# Patient Record
Sex: FEMALE | Race: WHITE | Employment: UNEMPLOYED
[De-identification: names, ages, dates, MRNs, and addresses within clinical notes are randomized per-mention and may not be internally consistent; named-entity substitution may affect disease eponyms.]

---

## 2023-09-06 ENCOUNTER — HOSPITAL ENCOUNTER (OUTPATIENT)
Facility: HOSPITAL | Age: 78
Setting detail: RECURRING SERIES
Discharge: HOME | End: 2023-09-09
Payer: MEDICARE

## 2023-09-06 PROCEDURE — 97162 PT EVAL MOD COMPLEX 30 MIN: CPT

## 2023-09-06 NOTE — THERAPY EVALUATION
interest  Persons(s) to be included in education: patient (P)  Barriers to Learning/Limitations: none  Measures taken if barriers to learning present: NA  Patient Goal (s): \"strengthening of joints, muscles\". Patient Self Reported Health Status: good  Rehabilitation Potential: good    Short Term Goals: To be accomplished in 3-4 weeks  Patient independent and compliant with progressive HEP/Self-Care Routine. Status at IE:  No HEP. Decrease max pain scale rating to </= 2/10. Status at IE:  range from 2 to 7/10. Increase PROM bilat hip ER to >/= 65 deg. Status at IE:  about 50 deg bilat. Increase trunk AROM for bilat rotations to decreased by </= 25% and without pain. Status at IE:  decreased by about 50% bilat and LBP. Long Term Goals: To be accomplished in 4-6 weeks  Improve FOTO scores by >/= 11 to 12 points. Status at IE:  knee 47; hip 47. Increase LE MMT scores by >/= 1/3 grade and/or to >/= 5-/5 throughout. Status at IE:  Knee Ext 4 to 4+/5 left with some anterior discomfort; Knee Flex 5-/5 left and OK; Ankle DF WFL left and OK; Ankle PF WFL left and OK; Ankle EV WFL left and OK; ankle INV WFL left and OK; Hip IR 4-/5 left with knee discomfort and 5/5 right and OK; Hip ER 5/5 and OK bilat; Hip Flex 5/5 and OK bilat; Hip Abduction (supine) 5/5 and OK bilat; Hip Adduction (supine) 5/5 and OK bilat; Hip Extension Not Tested/TBA; HR in SLS 4+/5 bilat, right > left. Sit <==> stand without hand use 5x in </= 15 seconds               Status at IE:  slowed and hand use. No tenderness to palpation. Status at IE:  some tenderness and increased muscle tension left L-S paraspinals and quadratus lumborum.      Frequency / Duration: Patient to be seen 2 times per week for 4-6 weeks    Patient/ Caregiver education and instruction: Diagnosis, prognosis, self care, activity modification, and exercises [x]  Plan of care has

## 2023-09-06 NOTE — PROGRESS NOTES
PHYSICAL / OCCUPATIONAL THERAPY - DAILY TREATMENT NOTE (updated )    Patient Name: Stacy Pagan    Date: 2023    : 1945  Insurance: Payor: [address] [place] [address] / Plan: Christi Lim / Product Type: *No Product type* /      Patient  verified Yes     Visit #   Current / Total 1 12   Time   In / Out 1:05 1:45   Pain   In / Out 2/10 2/10   Subjective Functional Status/Changes: See Eval/POC. TREATMENT AREA =  Pain in left hip [M25.552]  Pain in left knee [M25.562]  Pain in right hip [M25.551]  Other low back pain [M54.59]    OBJECTIVE    45 min [x]Eval - untimed                      Therapeutic Procedures: Tx Min Billable or 1:1 Min (if diff from [employer]) Procedure, Rationale, Specifics          Details if applicable:              Details if applicable:            Details if applicable:            Details if applicable:            Details if applicable:     0 0 MC BC Totals Reminder: bill using total billable min of TIMED therapeutic procedures (example: do not include dry needle or estim unattended, both untimed codes, in totals to left)  8-22 min = 1 unit; 23-37 min = 2 units; 38-52 min = 3 units; 53-67 min = 4 units; 68-82 min = 5 units   Total Total     [x]  Patient Education billed concurrently with other procedures   [x] Review HEP    [] Progressed/Changed HEP, detail:    [] Other detail:       Objective Information/Functional Measures/Assessment    See Eval/POC.     Patient will continue to benefit from skilled PT / OT services to modify and progress therapeutic interventions, analyze and address functional mobility deficits, analyze and address ROM deficits, analyze and address strength deficits, analyze and address soft tissue restrictions, analyze and cue for proper movement patterns, analyze and modify for postural abnormalities, analyze and address imbalance/dizziness, and instruct in home and community integration to address functional deficits and attain remaining

## 2023-09-21 ENCOUNTER — HOSPITAL ENCOUNTER (OUTPATIENT)
Facility: HOSPITAL | Age: 78
Setting detail: RECURRING SERIES
Discharge: HOME | End: 2023-09-24
Payer: MEDICARE

## 2023-09-21 PROCEDURE — 97110 THERAPEUTIC EXERCISES: CPT

## 2023-09-21 PROCEDURE — 97530 THERAPEUTIC ACTIVITIES: CPT

## 2023-09-21 NOTE — PROGRESS NOTES
PHYSICAL / OCCUPATIONAL THERAPY - DAILY TREATMENT NOTE (updated )    Patient Name: Cristina Elliott    Date: 2023    : 1945  Insurance: Payor: Mckinley Greene / Plan: Anisa Sharpe / Product Type: *No Product type* /      Patient  verified Yes     Visit #   Current / Total 2 12   Time   In / Out 11:43 12:26   Pain   In / Out 4/10 4/10   Subjective Functional Status/Changes: Pt reports pain down bilat LE's, left knee (medial) with crepitus, bilat feet; cramps this morning bilat feet. TREATMENT AREA =  Pain in left hip [M25.552]  Pain in left knee [M25.562]  Pain in right hip [M25.551]  Other low back pain [M54.59]    OBJECTIVE    Therapeutic Procedures: Tx Min Billable or 1:1 Min (if diff from Tx Min) Procedure, Rationale, Specifics   33 33 07224 Therapeutic Exercise (timed):  increase ROM, strength, coordination, balance, and proprioception to improve patient's ability to progress to PLOF and address remaining functional goals. (see flow sheet as applicable)     Details if applicable:  HEP activities instructed and performed with handouts issued and copies placed in chart. 10 95 56621 Therapeutic Activity (timed):  use of dynamic activities replicating functional movements to increase ROM, strength, coordination, balance, and proprioception in order to improve patient's ability to progress to PLOF and address remaining functional goals. (see flow sheet as applicable)     Details if applicable:     0 0 14537 Neuromuscular Re-Education (timed):  improve balance, coordination, kinesthetic sense, posture, core stability and proprioception to improve patient's ability to develop conscious control of individual muscles and awareness of position of extremities in order to progress to PLOF and address remaining functional goals.  (see flow sheet as applicable)     Details if applicable:     0 0 94482 Self Care/Home Management (timed):  improve patient knowledge and

## 2023-09-25 ENCOUNTER — HOSPITAL ENCOUNTER (OUTPATIENT)
Facility: HOSPITAL | Age: 78
Setting detail: RECURRING SERIES
Discharge: HOME | End: 2023-09-28
Payer: MEDICARE

## 2023-09-25 PROCEDURE — 97530 THERAPEUTIC ACTIVITIES: CPT

## 2023-09-25 PROCEDURE — 97110 THERAPEUTIC EXERCISES: CPT

## 2023-09-25 NOTE — PROGRESS NOTES
posture/ergonomics, home safety, activity modification, diagnosis/prognosis, and physical therapy expectations, procedures and progression  to improve patient's ability to progress to PLOF and address remaining functional goals. (see flow sheet as applicable)     Details if applicable:            Details if applicable:     36 40 MC BC Totals Reminder: bill using total billable min of TIMED therapeutic procedures (example: do not include dry needle or estim unattended, both untimed codes, in totals to left)  8-22 min = 1 unit; 23-37 min = 2 units; 38-52 min = 3 units; 53-67 min = 4 units; 68-82 min = 5 units   Total Total     [x]  Patient Education billed concurrently with other procedures   [x] Review HEP    [] Progressed/Changed HEP, detail:    [] Other detail:       Objective Information/Functional Measures/Assessment    Continue with therx per flow sheet. No change in function noted today. Patient will continue to benefit from skilled PT / OT services to modify and progress therapeutic interventions, analyze and address functional mobility deficits, analyze and address ROM deficits, analyze and address strength deficits, analyze and address soft tissue restrictions, analyze and cue for proper movement patterns, analyze and modify for postural abnormalities, analyze and address imbalance/dizziness, and instruct in home and community integration to address functional deficits and attain remaining goals. Progress toward goals / Updated goals:  []  See Progress Note/Recertification    Short Term Goals: To be accomplished in 3-4 weeks  Patient independent and compliant with progressive HEP/Self-Care Routine. Status at IE:  No HEP. HEP handout issued for 2 LE stretches. Decrease max pain scale rating to </= 2/10. Status at IE:  range from 2 to 7/10.   4/10 pre- and post-Tx. Increase PROM bilat hip ER to >/= 65 deg. Status at IE:  about 50 deg bilat.   Did well with

## 2023-09-26 ENCOUNTER — APPOINTMENT (OUTPATIENT)
Facility: HOSPITAL | Age: 78
End: 2023-09-26
Payer: MEDICARE

## 2023-09-27 ENCOUNTER — HOSPITAL ENCOUNTER (OUTPATIENT)
Facility: HOSPITAL | Age: 78
Setting detail: RECURRING SERIES
Discharge: HOME | End: 2023-09-30
Payer: MEDICARE

## 2023-09-27 PROCEDURE — 97530 THERAPEUTIC ACTIVITIES: CPT

## 2023-09-27 PROCEDURE — 97110 THERAPEUTIC EXERCISES: CPT

## 2023-10-03 ENCOUNTER — HOSPITAL ENCOUNTER (OUTPATIENT)
Facility: HOSPITAL | Age: 78
Setting detail: RECURRING SERIES
Discharge: HOME | End: 2023-10-06
Payer: MEDICARE

## 2023-10-03 PROCEDURE — 97530 THERAPEUTIC ACTIVITIES: CPT

## 2023-10-03 PROCEDURE — 97535 SELF CARE MNGMENT TRAINING: CPT

## 2023-10-03 PROCEDURE — 97110 THERAPEUTIC EXERCISES: CPT

## 2023-10-03 NOTE — PROGRESS NOTES
PHYSICAL / OCCUPATIONAL THERAPY - DAILY TREATMENT NOTE (updated )    Patient Name: Abdiel Andres    Date: 10/3/2023    : 1945  Insurance: Payor: Andrez Jimenes / Plan: Matt Levy / Product Type: *No Product type* /      Patient  verified Yes     Visit #   Current / Total 5 12   Time   In / Out 1145 1225   Pain   In / Out 3 (R) thigh area. 0   Subjective Functional Status/Changes: I don't really have to much pain. TREATMENT AREA =  Pain in left hip [M25.552]  Pain in left knee [M25.562]  Pain in right hip [M25.551]  Other low back pain [M54.59]    OBJECTIVE    Therapeutic Procedures: Tx Min Billable or 1:1 Min (if diff from Tx Min) Procedure, Rationale, Specifics   10 10 07737 Therapeutic Exercise (timed):  increase ROM, strength, coordination, balance, and proprioception to improve patient's ability to progress to PLOF and address remaining functional goals. (see flow sheet as applicable)     Details if applicable:  HEP activities instructed and performed with handouts issued and copies placed in chart. 15 15 64199 Therapeutic Activity (timed):  use of dynamic activities replicating functional movements to increase ROM, strength, coordination, balance, and proprioception in order to improve patient's ability to progress to PLOF and address remaining functional goals. (see flow sheet as applicable)     Details if applicable:       52541 Neuromuscular Re-Education (timed):  improve balance, coordination, kinesthetic sense, posture, core stability and proprioception to improve patient's ability to develop conscious control of individual muscles and awareness of position of extremities in order to progress to PLOF and address remaining functional goals.  (see flow sheet as applicable)     Details if applicable:     15 15 46503 Self Care/Home Management (timed):  improve patient knowledge and understanding of pain reducing techniques, positioning, posture/ergonomics, home

## 2023-10-05 ENCOUNTER — HOSPITAL ENCOUNTER (OUTPATIENT)
Facility: HOSPITAL | Age: 78
Setting detail: RECURRING SERIES
Discharge: HOME | End: 2023-10-08
Payer: MEDICARE

## 2023-10-05 PROCEDURE — 97110 THERAPEUTIC EXERCISES: CPT

## 2023-10-05 PROCEDURE — 97535 SELF CARE MNGMENT TRAINING: CPT

## 2023-10-05 PROCEDURE — 97530 THERAPEUTIC ACTIVITIES: CPT

## 2023-10-05 NOTE — PROGRESS NOTES
PHYSICAL / OCCUPATIONAL THERAPY - DAILY TREATMENT NOTE (updated )    Patient Name: Gary Limb    Date: 10/5/2023    : 1945  Insurance: Payor: Maria M Hearing / Plan: Ricci Wu / Product Type: *No Product type* /      Patient  verified Yes     Visit #   Current / Total 6 12   Time   In / Out 11:57 am 1246 pm   Pain   In / Out 2-3 0   Subjective Functional Status/Changes: Low back 2-3/10 ; soreness R hip intermittent numbness in the last 2 days . Having increased stress secondary to loosing phone. \"Knocking\" in left knee   Performing HEP daily  Pain range: 1 to 3/10      TREATMENT AREA =  Pain in left hip [M25.552]  Pain in left knee [M25.562]  Pain in right hip [M25.551]  Other low back pain [M54.59]    OBJECTIVE    Modalities Rationale:     decrease edema, decrease inflammation, decrease pain, and increase tissue extensibility to improve patient's ability to progress to PLOF and address remaining functional goals. min [] Estim Unattended, type/location:                                      []  w/ice    []  w/heat    min [] Estim Attended, type/location:                                     []  w/US     []  w/ice    []  w/heat    []  TENS insruct      min []  Mechanical Traction: type/lbs                   []  pro   []  sup   []  int   []  cont    []  before manual    []  after manual    min []  Ultrasound, settings/location:      min  unbill []  Ice     []  Heat    location/position:     min []  Paraffin,  details:     min []  Vasopneumatic Device, press/temp:     min []  Joleen Mariano / [place]: If using vaso (only need to measure limb vaso being performed on)      pre-treatment girth :       post-treatment girth :       measured at (landmark location) :      min []  Other:    Skin assessment post-treatment:   Intact      Therapeutic Procedures:     Tx Min Billable or 1:1 Min (if diff from Tx Min) Procedure, Rationale, Specifics   23  80554 Therapeutic Exercise (timed):

## 2023-10-05 NOTE — PROGRESS NOTES
[facility]  [address] [facility], [address], [place], [address] Ph: [phone] Fx: [phone]  PHYSICAL THERAPY PROGRESS NOTE  Patient Name: Flavia Marie : 1945   Treatment/Medical Diagnosis: Pain in left hip [M25.552]  Pain in left knee [M25.562]  Pain in right hip [M25.551]  Other low back pain [M54.59]   Referral Source: Mindy Womack MD     Date of Initial Visit: 2023 Attended Visits: 6 Missed Visits: 0     SUMMARY OF TREATMENT  Physical Therapy treatment has consisted of Therapeutic exercise , Therapeutic Activity for positioning, body mechanics, activity modification for pain management, Neuromuscular re education, and HEP    CURRENT STATUS  Ms Frances Gonzalez has made good progress in physical therapy. Pt reports her pain range: 0-3/ 10   GROC: +1 a tiny bit better  Functional Deficits: quit a bit of difficulty walking a mile, standing for an hour; Moderate difficulty performing heavy activities around the home, performing light activities around the home    Patient independent and compliant with progressive HEP/Self-Care Routine. Status at IE:  No HEP. Current Status: Pt performing HEP daily  Goal Met?  yes     Decrease max pain scale rating to </= 2/10. Status at IE:  range from 2 to 7/10. Current Status: 0-3/10   Goal Met? Partially met     Increase PROM bilat hip ER to >/= 65 deg. Status at IE:  about 50 deg bilat. Current status: ~ 65 degrees bilaterally  Goal Met?  yes     Increase trunk AROM for bilat rotations to decreased by </= 25% and without pain. Status at IE:  decreased by about 50% bilat and LBP. Current Status: AROM bilateral rotations WNLs and without pain  Goal Met?  yes    Payor: [employer] MEDICARE / Plan: [employer]ICARE / Product Type: *No Product type* /     Long Term Goals:  To be accomplished in 4-6 weeks  Improve FOTO scores by >/= 11

## 2023-10-10 ENCOUNTER — HOSPITAL ENCOUNTER (OUTPATIENT)
Facility: HOSPITAL | Age: 78
Setting detail: RECURRING SERIES
Discharge: HOME | End: 2023-10-13
Payer: MEDICARE

## 2023-10-10 PROCEDURE — 97112 NEUROMUSCULAR REEDUCATION: CPT

## 2023-10-10 PROCEDURE — 97530 THERAPEUTIC ACTIVITIES: CPT

## 2023-10-10 PROCEDURE — 97110 THERAPEUTIC EXERCISES: CPT

## 2023-10-10 NOTE — PROGRESS NOTES
PHYSICAL / OCCUPATIONAL THERAPY - DAILY TREATMENT NOTE (updated )    Patient Name: Abdiel Andres    Date: 10/10/2023    : 1945  Insurance: Payor: Andrez Jimenes / Plan: Matt Levy / Product Type: *No Product type* /      Patient  verified Yes     Visit #   Current / Total 7 20   Time   In / Out 220 300   Pain   In / Out 2 0   Subjective Functional Status/Changes: No new c/o     TREATMENT AREA =  Pain in left hip [M25.552]  Pain in left knee [M25.562]  Pain in right hip [M25.551]  Other low back pain [M54.59]    OBJECTIVE    Therapeutic Procedures: Tx Min Billable or 1:1 Min (if diff from Tx Min) Procedure, Rationale, Specifics   15 15 15240 Therapeutic Exercise (timed):  increase ROM, strength, coordination, balance, and proprioception to improve patient's ability to progress to PLOF and address remaining functional goals. (see flow sheet as applicable)     Details if applicable:  HEP activities instructed and performed with handouts issued and copies placed in chart. 15 02 75161 Therapeutic Activity (timed):  use of dynamic activities replicating functional movements to increase ROM, strength, coordination, balance, and proprioception in order to improve patient's ability to progress to PLOF and address remaining functional goals. (see flow sheet as applicable)     Details if applicable:     10 10 21336 Neuromuscular Re-Education (timed):  improve balance, coordination, kinesthetic sense, posture, core stability and proprioception to improve patient's ability to develop conscious control of individual muscles and awareness of position of extremities in order to progress to PLOF and address remaining functional goals.  (see flow sheet as applicable)     Details if applicable:       22762 Self Care/Home Management (timed):  improve patient knowledge and understanding of pain reducing techniques, positioning, posture/ergonomics, home safety, activity modification,

## 2023-10-12 ENCOUNTER — HOSPITAL ENCOUNTER (OUTPATIENT)
Facility: HOSPITAL | Age: 78
Setting detail: RECURRING SERIES
Discharge: HOME | End: 2023-10-15
Payer: MEDICARE

## 2023-10-12 PROCEDURE — 97110 THERAPEUTIC EXERCISES: CPT

## 2023-10-12 PROCEDURE — 97112 NEUROMUSCULAR REEDUCATION: CPT

## 2023-10-12 NOTE — PROGRESS NOTES
PHYSICAL / OCCUPATIONAL THERAPY - DAILY TREATMENT NOTE (updated )    Patient Name: Rex Ends    Date: 10/12/2023    : 1945  Insurance: Payor: Katie Danielle / Plan: Dirk Baig / Product Type: *No Product type* /      Patient  verified Yes     Visit #   Current / Total 8 20   Time   In / Out 11:45 13:35   Pain   In / Out 2/10 0/10   Subjective Functional Status/Changes: Pt reports left > right anterior LE discomfort--\"radiating\". TREATMENT AREA =  Pain in left hip [M25.552]  Pain in left knee [M25.562]  Pain in right hip [M25.551]  Other low back pain [M54.59]    OBJECTIVE    Therapeutic Procedures: Tx Min Billable or 1:1 Min (if diff from Tx Min) Procedure, Rationale, Specifics   40 35 93586 Therapeutic Exercise (timed):  increase ROM, strength, coordination, balance, and proprioception to improve patient's ability to progress to PLOF and address remaining functional goals. (see flow sheet as applicable)     Details if applicable:       0 0 24116 Therapeutic Activity (timed):  use of dynamic activities replicating functional movements to increase ROM, strength, coordination, balance, and proprioception in order to improve patient's ability to progress to PLOF and address remaining functional goals. (see flow sheet as applicable)     Details if applicable:     10 10 32586 Neuromuscular Re-Education (timed):  improve balance, coordination, kinesthetic sense, posture, core stability and proprioception to improve patient's ability to develop conscious control of individual muscles and awareness of position of extremities in order to progress to PLOF and address remaining functional goals.  (see flow sheet as applicable)     Details if applicable:            Details if applicable:            Details if applicable:     50 39 [address] Reminder: bill using total billable min of TIMED therapeutic procedures (example: do not include dry needle or estim unattended, both

## 2023-11-01 ENCOUNTER — HOSPITAL ENCOUNTER (OUTPATIENT)
Facility: HOSPITAL | Age: 78
Setting detail: RECURRING SERIES
Discharge: HOME | End: 2023-11-04
Payer: MEDICARE

## 2023-11-01 PROCEDURE — 97112 NEUROMUSCULAR REEDUCATION: CPT

## 2023-11-01 PROCEDURE — 97110 THERAPEUTIC EXERCISES: CPT

## 2023-11-01 PROCEDURE — 97530 THERAPEUTIC ACTIVITIES: CPT

## 2023-11-01 NOTE — PROGRESS NOTES
PHYSICAL / OCCUPATIONAL THERAPY - DAILY TREATMENT NOTE (updated )    Patient Name: Mayela Lentz    Date: 2023    : 1945  Insurance: Payor: Nikko Lopez / Plan: Jillian Lizbeth / Product Type: *No Product type* /      Patient  verified Yes     Visit #   Current / Total 9 20   Time   In / Out 5:03 5:46   Pain   In / Out 2/10 0/10   Subjective Functional Status/Changes: Feeling low on energy, has pain in legs at night due to neuropathy, would like to build endurance     TREATMENT AREA =  Pain in left hip [M25.552]  Pain in left knee [M25.562]  Pain in right hip [M25.551]  Other low back pain [M54.59]    OBJECTIVE       Therapeutic Procedures: Tx Min Billable or 1:1 Min (if diff from Tx Min) Procedure, Rationale, Specifics   15  47326 Therapeutic Exercise (timed):  increase ROM, strength, coordination, balance, and proprioception to improve patient's ability to progress to PLOF and address remaining functional goals. (see flow sheet as applicable)     Details if applicable:       15  23397 Neuromuscular Re-Education (timed):  improve balance, coordination, kinesthetic sense, posture, core stability and proprioception to improve patient's ability to develop conscious control of individual muscles and awareness of position of extremities in order to progress to PLOF and address remaining functional goals. (see flow sheet as applicable)     Details if applicable:     13  81566 Therapeutic Activity (timed):  use of dynamic activities replicating functional movements to increase ROM, strength, coordination, balance, and proprioception in order to improve patient's ability to progress to PLOF and address remaining functional goals.   (see flow sheet as applicable)     Details if applicable:            Details if applicable:            Details if applicable:       [facility] BC Totals Reminder: bill using total billable min of TIMED therapeutic procedures (example: do not include dry

## 2023-11-06 ENCOUNTER — HOSPITAL ENCOUNTER (OUTPATIENT)
Facility: HOSPITAL | Age: 78
Setting detail: RECURRING SERIES
Discharge: HOME | End: 2023-11-09
Payer: MEDICARE

## 2023-11-06 PROCEDURE — 97535 SELF CARE MNGMENT TRAINING: CPT

## 2023-11-06 PROCEDURE — 97112 NEUROMUSCULAR REEDUCATION: CPT

## 2023-11-06 PROCEDURE — 97110 THERAPEUTIC EXERCISES: CPT

## 2023-11-06 NOTE — PROGRESS NOTES
[facility]  [address] [facility], [address], [place], [address] Ph: [phone] Fx: [phone]  PHYSICAL THERAPY PROGRESS NOTE  Patient Name: Abdiel Andres : 1945   Treatment/Medical Diagnosis: Pain in left hip [M25.552]  Pain in left knee [M25.562]  Pain in right hip [M25.551]  Other low back pain [M54.59]   Referral Source: Ollie Wynne MD     Date of Initial Visit: 2023 Attended Visits: 10 Missed Visits: 0     SUMMARY OF TREATMENT  Physical Therapy treatment has consisted of Therapeutic exercise for LE and lumbar stabilization and strengthening, Therapeutic Activity for positioning, body mechanics, activity modification, Neuromuscular re education, and HEP. CURRENT STATUS  Pt is making slow steady progress  in physical therapy Pt reports pain range: 0 to 5/10 . L ankle ROM: WNLs, no tenderness  to palpation. ; Left knee- no tenderness; soreness and weakness with stair navigation; Left hip: no tenderness to palpation; Right hip intermittent tenderness/soreness/numbness. Walking ~ 10 min   Pt reports compliance in HEP. Quite a bit of difficulty walking a mile, performing heavy activities around the home, moderate difficulty performing her usual work, housework, performing light activities around the home,, getting up or down 10 stairs , a little bit of difficulty getting into or out of the bath    Improve FOTO scores by >/= 11 to 12 points. Status at IE:  knee 47; hip 47. Current Status: 47  Goal Met?  no    2. Increase LE MMT scores by >/= 1/3 grade and/or to >/= 5-/5 throughout. Status at IE:  Knee Ext 4 to 4+/5 left with some anterior discomfort; Knee Flex 5-/5 left and OK; Ankle DF WFL left and OK; Ankle PF WFL left and OK; Ankle EV WFL left and OK; ankle INV WFL left and OK; Hip IR 4-/5 left with knee discomfort and 5/5 right and OK;  Hip ER 5/5 and OK bilat; Hip Flex 5/5 and OK bilat;

## 2023-11-09 ENCOUNTER — HOSPITAL ENCOUNTER (OUTPATIENT)
Facility: HOSPITAL | Age: 78
Setting detail: RECURRING SERIES
Discharge: HOME | End: 2023-11-12
Payer: MEDICARE

## 2023-11-09 PROCEDURE — 97112 NEUROMUSCULAR REEDUCATION: CPT

## 2023-11-09 PROCEDURE — 97530 THERAPEUTIC ACTIVITIES: CPT

## 2023-11-09 PROCEDURE — 97110 THERAPEUTIC EXERCISES: CPT

## 2023-11-09 NOTE — PROGRESS NOTES
PHYSICAL / OCCUPATIONAL THERAPY - DAILY TREATMENT NOTE (updated )    Patient Name: Rex Ends    Date: 2023    : 1945  Insurance: Payor: Katie Danielle / Plan: Dirk Baig / Product Type: *No Product type* /      Patient  verified Yes     Visit #   Current / Total 11 20   Time   In / Out 300 340   Pain   In / Out 1 0   Subjective Functional Status/Changes: I'm note really having any pain right now. TREATMENT AREA =  Pain in left hip [M25.552]  Pain in left knee [M25.562]  Pain in right hip [M25.551]  Other low back pain [M54.59]    OBJECTIVE  Modalities Rationale:     decrease inflammation, decrease pain, and increase tissue extensibility to improve patient's ability to progress to PLOF and address remaining functional goals. min [] Estim Unattended, type/location:                                      []  w/ice    []  w/heat    min [] Estim Attended, type/location:                                     []  w/US     []  w/ice    []  w/heat    []  TENS insruct      min []  Mechanical Traction: type/lbs                   []  pro   []  sup   []  int   []  cont    []  before manual    []  after manual    min []  Ultrasound, settings/location:      min  unbill []  Ice     []  Heat    location/position:     min []  Paraffin,  details:     min []  Vasopneumatic Device, press/temp:     min []  Charlann Dues / Exie Guerrero: If using vaso (only need to measure limb vaso being performed on)      pre-treatment girth :       post-treatment girth :       measured at (landmark location) :      min []  Other:    Skin assessment post-treatment:   Intact       Therapeutic Procedures: Tx Min Billable or 1:1 Min (if diff from Tx Min) Procedure, Rationale, Specifics     68505 Manual Therapy (timed):  decrease pain, increase ROM, and increase tissue extensibility to improve patient's ability to progress to PLOF and address remaining functional goals.   The manual therapy interventions were

## 2023-11-15 ENCOUNTER — HOSPITAL ENCOUNTER (OUTPATIENT)
Facility: HOSPITAL | Age: 78
Setting detail: RECURRING SERIES
Discharge: HOME | End: 2023-11-18
Payer: MEDICARE

## 2023-11-15 PROCEDURE — 97110 THERAPEUTIC EXERCISES: CPT

## 2023-11-15 PROCEDURE — 97530 THERAPEUTIC ACTIVITIES: CPT

## 2023-11-15 PROCEDURE — 97112 NEUROMUSCULAR REEDUCATION: CPT

## 2023-11-15 NOTE — PROGRESS NOTES
codes, in totals to left)  8-22 min = 1 unit; 23-37 min = 2 units; 38-52 min = 3 units; 53-67 min = 4 units; 68-82 min = 5 units   Total Total     [x]  Patient Education billed concurrently with other procedures   [x] Review HEP    [] Progressed/Changed HEP, detail:    [] Other detail:       Objective Information/Functional Measures/Assessment    Vcs for proper eccentric control, hip hinge in sit to stand body mechanics; pt education in activity modification, use of HEP for sx management  Moderate restrictions in bilateral hip ER AROM     Patient will continue to benefit from skilled PT / OT services to modify and progress therapeutic interventions, analyze and address functional mobility deficits, analyze and address ROM deficits, analyze and address strength deficits, analyze and address soft tissue restrictions, analyze and cue for proper movement patterns, analyze and modify for postural abnormalities, and instruct in home and community integration to address functional deficits and attain remaining goals. Progress toward goals / Updated goals:  []  See Progress Note/Recertification  Improve FOTO scores by >/= 11 to 12 points. Status at IE:  knee 47; hip 47. Current Status: 47  Goal Met? not reassessed this session      2. Increase LE MMT scores by >/= 1/3 grade and/or to >/= 5-/5 throughout. Status at IE:  Knee Ext 4 to 4+/5 left with some anterior discomfort; Knee Flex 5-/5 left and OK; Ankle DF WFL left and OK; Ankle PF WFL left and OK; Ankle EV WFL left and OK; ankle INV WFL left and OK; Hip IR 4-/5 left with knee discomfort and 5/5 right and OK; Hip ER 5/5 and OK bilat; Hip Flex 5/5 and OK bilat; Hip Abduction (supine) 5/5 and OK bilat; Hip Adduction (supine) 5/5 and OK bilat; Hip Extension Not Tested/TBA; HR in SLS 4+/5 bilat, right > left.            Current Status: MMT: Hip ext: R:4/5;  L:4-/5 , L:  Hip flex:MARGRET: 5/5 ,    hip Abd: R: 5/5 , L: 5/5  ,   Hip IR:R: 5/5 , L: